# Patient Record
Sex: MALE | Race: WHITE | NOT HISPANIC OR LATINO | Employment: FULL TIME | URBAN - METROPOLITAN AREA
[De-identification: names, ages, dates, MRNs, and addresses within clinical notes are randomized per-mention and may not be internally consistent; named-entity substitution may affect disease eponyms.]

---

## 2018-06-22 ENCOUNTER — LAB REQUISITION (OUTPATIENT)
Dept: LAB | Facility: HOSPITAL | Age: 23
End: 2018-06-22

## 2018-06-22 DIAGNOSIS — Z00.00 ROUTINE GENERAL MEDICAL EXAMINATION AT A HEALTH CARE FACILITY: ICD-10-CM

## 2018-06-22 LAB
ALBUMIN SERPL-MCNC: 4.36 G/DL (ref 3.2–4.8)
ALBUMIN SERPL-MCNC: 4.36 G/DL (ref 3.2–4.8)
ALBUMIN/GLOB SERPL: 1.7 G/DL (ref 1.5–2.5)
ALP SERPL-CCNC: 83 U/L (ref 25–100)
ALP SERPL-CCNC: 83 U/L (ref 25–100)
ALT SERPL W P-5'-P-CCNC: 30 U/L (ref 7–40)
ALT SERPL W P-5'-P-CCNC: 30 U/L (ref 7–40)
ANION GAP SERPL CALCULATED.3IONS-SCNC: 6 MMOL/L (ref 3–11)
AST SERPL-CCNC: 23 U/L (ref 0–33)
AST SERPL-CCNC: 23 U/L (ref 0–33)
BASOPHILS # BLD AUTO: 0.02 10*3/MM3 (ref 0–0.2)
BASOPHILS NFR BLD AUTO: 0.3 % (ref 0–1)
BILIRUB CONJ SERPL-MCNC: 0.2 MG/DL (ref 0–0.2)
BILIRUB INDIRECT SERPL-MCNC: 0.8 MG/DL (ref 0.1–1.1)
BILIRUB SERPL-MCNC: 1 MG/DL (ref 0.3–1.2)
BILIRUB SERPL-MCNC: 1 MG/DL (ref 0.3–1.2)
BUN BLD-MCNC: 12 MG/DL (ref 9–23)
BUN/CREAT SERPL: 9.9 (ref 7–25)
CALCIUM SPEC-SCNC: 9.1 MG/DL (ref 8.7–10.4)
CHLORIDE SERPL-SCNC: 109 MMOL/L (ref 99–109)
CO2 SERPL-SCNC: 29 MMOL/L (ref 20–31)
CREAT BLD-MCNC: 1.21 MG/DL (ref 0.6–1.3)
DEPRECATED RDW RBC AUTO: 42.2 FL (ref 37–54)
EOSINOPHIL # BLD AUTO: 0.11 10*3/MM3 (ref 0–0.3)
EOSINOPHIL NFR BLD AUTO: 1.7 % (ref 0–3)
ERYTHROCYTE [DISTWIDTH] IN BLOOD BY AUTOMATED COUNT: 13 % (ref 11.3–14.5)
GFR SERPL CREATININE-BSD FRML MDRD: 74 ML/MIN/1.73
GFR SERPL CREATININE-BSD FRML MDRD: 89 ML/MIN/1.73
GLOBULIN UR ELPH-MCNC: 2.5 GM/DL
GLUCOSE BLD-MCNC: 90 MG/DL (ref 70–100)
HCT VFR BLD AUTO: 45.9 % (ref 38.9–50.9)
HGB BLD-MCNC: 15.5 G/DL (ref 13.1–17.5)
IMM GRANULOCYTES # BLD: 0.04 10*3/MM3 (ref 0–0.03)
IMM GRANULOCYTES NFR BLD: 0.6 % (ref 0–0.6)
LYMPHOCYTES # BLD AUTO: 2.54 10*3/MM3 (ref 0.6–4.8)
LYMPHOCYTES NFR BLD AUTO: 40.3 % (ref 24–44)
MCH RBC QN AUTO: 30 PG (ref 27–31)
MCHC RBC AUTO-ENTMCNC: 33.8 G/DL (ref 32–36)
MCV RBC AUTO: 89 FL (ref 80–99)
MONOCYTES # BLD AUTO: 0.64 10*3/MM3 (ref 0–1)
MONOCYTES NFR BLD AUTO: 10.1 % (ref 0–12)
NEUTROPHILS # BLD AUTO: 2.96 10*3/MM3 (ref 1.5–8.3)
NEUTROPHILS NFR BLD AUTO: 47 % (ref 41–71)
PLATELET # BLD AUTO: 238 10*3/MM3 (ref 150–450)
PMV BLD AUTO: 10.3 FL (ref 6–12)
POTASSIUM BLD-SCNC: 4.1 MMOL/L (ref 3.5–5.5)
PROT SERPL-MCNC: 6.9 G/DL (ref 5.7–8.2)
PROT SERPL-MCNC: 6.9 G/DL (ref 5.7–8.2)
RBC # BLD AUTO: 5.16 10*6/MM3 (ref 4.2–5.76)
SODIUM BLD-SCNC: 144 MMOL/L (ref 132–146)
WBC NRBC COR # BLD: 6.31 10*3/MM3 (ref 3.5–10.8)

## 2018-06-22 PROCEDURE — 80076 HEPATIC FUNCTION PANEL: CPT

## 2018-06-22 PROCEDURE — 85025 COMPLETE CBC W/AUTO DIFF WBC: CPT

## 2018-06-22 PROCEDURE — 80053 COMPREHEN METABOLIC PANEL: CPT

## 2020-02-03 ENCOUNTER — OFFICE VISIT (OUTPATIENT)
Dept: INTERNAL MEDICINE | Facility: CLINIC | Age: 25
End: 2020-02-03

## 2020-02-03 ENCOUNTER — RESULTS ENCOUNTER (OUTPATIENT)
Dept: INTERNAL MEDICINE | Facility: CLINIC | Age: 25
End: 2020-02-03

## 2020-02-03 VITALS
WEIGHT: 177 LBS | SYSTOLIC BLOOD PRESSURE: 118 MMHG | TEMPERATURE: 98.4 F | BODY MASS INDEX: 25.34 KG/M2 | DIASTOLIC BLOOD PRESSURE: 72 MMHG | HEIGHT: 70 IN | HEART RATE: 89 BPM | OXYGEN SATURATION: 99 %

## 2020-02-03 DIAGNOSIS — F33.1 MODERATE EPISODE OF RECURRENT MAJOR DEPRESSIVE DISORDER (HCC): ICD-10-CM

## 2020-02-03 DIAGNOSIS — Z23 NEED FOR TDAP VACCINATION: ICD-10-CM

## 2020-02-03 DIAGNOSIS — Z13.29 SCREENING FOR ENDOCRINE, METABOLIC AND IMMUNITY DISORDER: ICD-10-CM

## 2020-02-03 DIAGNOSIS — Z13.0 SCREENING FOR ENDOCRINE, METABOLIC AND IMMUNITY DISORDER: ICD-10-CM

## 2020-02-03 DIAGNOSIS — Z28.21 PNEUMOCOCCAL VACCINATION DECLINED: ICD-10-CM

## 2020-02-03 DIAGNOSIS — Z13.228 SCREENING FOR ENDOCRINE, METABOLIC AND IMMUNITY DISORDER: ICD-10-CM

## 2020-02-03 DIAGNOSIS — Z76.89 ENCOUNTER TO ESTABLISH CARE: Primary | ICD-10-CM

## 2020-02-03 DIAGNOSIS — Z13.0 SCREENING FOR DISORDER OF BLOOD AND BLOOD-FORMING ORGANS: ICD-10-CM

## 2020-02-03 DIAGNOSIS — Z28.21 INFLUENZA VACCINATION DECLINED: ICD-10-CM

## 2020-02-03 DIAGNOSIS — F41.9 ANXIETY: ICD-10-CM

## 2020-02-03 DIAGNOSIS — Z00.00 ANNUAL PHYSICAL EXAM: ICD-10-CM

## 2020-02-03 PROCEDURE — 99385 PREV VISIT NEW AGE 18-39: CPT | Performed by: NURSE PRACTITIONER

## 2020-02-03 RX ORDER — HYDROXYZINE PAMOATE 25 MG/1
25 CAPSULE ORAL 3 TIMES DAILY PRN
Qty: 90 CAPSULE | Refills: 0 | Status: SHIPPED | OUTPATIENT
Start: 2020-02-03 | End: 2020-03-16 | Stop reason: SDUPTHER

## 2020-02-03 RX ORDER — AMOXICILLIN 875 MG/1
875 TABLET, COATED ORAL 2 TIMES DAILY
COMMUNITY
End: 2020-02-17

## 2020-02-03 RX ORDER — DICYCLOMINE HYDROCHLORIDE 10 MG/1
CAPSULE ORAL
COMMUNITY
Start: 2020-01-20 | End: 2020-02-03

## 2020-02-03 NOTE — PROGRESS NOTES
Chief Complaint   Patient presents with   • Establish Care     wanting gene sight testing done       Alan Bennett is a 24 y.o. male and is here for a comprehensive physical exam. The patient reports he has history of anxiety and depression.  He has been treated before, was prescribed trazodone & stopped taking it because he didn't like the way it made him feel. His mind continued to race while taking it, increased anxiety.  Anxiety scale = 19, PHQ9 =12 today. He has a significant family history of anxiety and depression.  Sister told him about genetic testing to determine best medication for symptoms, and he would like to be tested.  He reports racing thoughts, social anxiety, excessive worry that keeps him from being able to fall asleep, isn't enjoying activities he normally enjoys, decreased concentration.  He denies SI, HI at this time, states he has considered suicide in the past but never developed a plan or acted on feelings.    He has sinusitis, taking amoxicillin x 2 days.  Had leftover prescription from wisdom teeth, with 5 days left.  Symptoms have been present for 3 days, and include congestion, cough, frequent clearing of the throat, headaches, itchy eyes, itchy nose, wheezing, sinus pressure, sneezing, SOA and diarrhea.   No h/o allergic rhinitis, seasonal allergies.       History:  Erectile dysfunction  no  Nocturia  no    Do you take any herbs or supplements that were not prescribed by a doctor? no    Are you taking aspirin daily? no    Health Habits:  Dental Exam. up to date  Eye Exam. up to date  Exercise: 0 times/week.  Current exercise activities include: none    Health Maintenance   Topic Date Due   • PNEUMOCOCCAL VACCINE (19-64 MEDIUM RISK) (1 of 1 - PPSV23) 02/03/2020 (Originally 7/30/2012)   • TDAP/TD VACCINES (1 - Tdap) 02/03/2020 (Originally 7/30/2004)   • ANNUAL PHYSICAL  02/04/2021   • INFLUENZA VACCINE  Addressed       PMH, PSH, SocHx, FamHx, Allergies, and Medications: Reviewed and  "updated in the Visit Navigator.     No Known Allergies  Past Medical History:   Diagnosis Date   • Anxiety    • Asthma    • Depression      History reviewed. No pertinent surgical history.  Social History     Socioeconomic History   • Marital status: Single     Spouse name: Not on file   • Number of children: Not on file   • Years of education: Not on file   • Highest education level: Not on file   Tobacco Use   • Smoking status: Current Every Day Smoker     Packs/day: 1.00   • Smokeless tobacco: Never Used   Substance and Sexual Activity   • Alcohol use: Never     Frequency: Never   • Drug use: Never     Family History   Problem Relation Age of Onset   • Thyroid disease Mother    • Alcohol abuse Maternal Uncle        Review of Systems  Review of Systems   Constitutional: Positive for fatigue.   HENT: Positive for congestion. Negative for ear pain, tinnitus, trouble swallowing and voice change.    Eyes: Negative for photophobia and visual disturbance.   Respiratory: Negative for chest tightness.    Cardiovascular: Negative for chest pain, palpitations and leg swelling.   Gastrointestinal: Negative for abdominal pain, blood in stool, constipation, nausea and vomiting.   Endocrine: Negative.    Genitourinary: Negative.    Musculoskeletal: Negative.    Skin: Negative for pallor and rash.   Neurological: Negative for seizures, syncope, facial asymmetry, speech difficulty, weakness and light-headedness.   Hematological: Does not bruise/bleed easily.   Psychiatric/Behavioral: Negative for agitation and hallucinations.       Objective   /72   Pulse 89   Temp 98.4 °F (36.9 °C) (Temporal)   Ht 177.8 cm (70\")   Wt 80.3 kg (177 lb)   SpO2 99%   BMI 25.40 kg/m²     Physical Exam   Constitutional: He is oriented to person, place, and time. He appears well-developed and well-nourished. No distress.   HENT:   Head: Normocephalic.   Right Ear: Tympanic membrane, external ear and ear canal normal.   Left Ear: Tympanic " membrane, external ear and ear canal normal.   Nose: No sinus tenderness.   Mouth/Throat: Uvula is midline and mucous membranes are normal. Posterior oropharyngeal erythema present.   Eyes: Pupils are equal, round, and reactive to light. Conjunctivae and EOM are normal.   Neck: Normal range of motion. Neck supple. No thyromegaly present.   Cardiovascular: Normal rate, regular rhythm, normal heart sounds and intact distal pulses.   Pulmonary/Chest: Effort normal and breath sounds normal.   Abdominal: Soft. Normal aorta. There is no hepatosplenomegaly. There is no tenderness. There is no CVA tenderness.   Musculoskeletal: Normal range of motion.   Lymphadenopathy:     He has no cervical adenopathy.   Neurological: He is alert and oriented to person, place, and time. He has normal strength. No sensory deficit. Coordination and gait normal.   CN II-XII normal   Skin: Skin is warm. Capillary refill takes less than 2 seconds.   Psychiatric: His speech is normal and behavior is normal. Thought content normal.   Affect rather flat       Assessment/Plan   1. Healthy male exam.    2. Patient Counseling: Including but not Limited to the following, when appropriate:  --Nutrition: Stressed importance of moderation in sodium/caffeine intake, saturated fat and cholesterol, caloric balance, sufficient intake of fresh fruits, vegetables, fiber  --Exercise: Stressed the importance of regular exercise.   --Substance Abuse: Discussed cessation/primary prevention of tobacco, alcohol, or other drug use; driving or other dangerous activities under the influence; availability of treatment for abuse, as indicated based on social history.    --Sexuality: Discussed sexually transmitted diseases, partner selection, use of condoms and contraceptive alternatives.   --Injury prevention: Discussed safety belts, safety helmets, smoke detector, smoking near bedding or upholstery.   --Dental health: Discussed importance of regular tooth brushing,  flossing, and dental visits.  --Immunizations reviewed.  3. Discussed the patient's BMI with him.  The BMI is in the acceptable range  4. Return if symptoms worsen or fail to improve.  5. Age-appropriate Screening Scheduled    Assessment/Plan     Alan was seen today for establish care.    Diagnoses and all orders for this visit:    Encounter to establish care    Annual physical exam    Influenza vaccination declined    Pneumococcal vaccination declined    Need for Tdap vaccination        - Declined   Anxiety  -     Genetic Testing - Swab,; Future  -     Ambulatory Referral to Behavioral Health  -     T4, Free  -     TSH  -     hydrOXYzine pamoate (VISTARIL) 25 MG capsule; Take 1 capsule by mouth 3 (Three) Times a Day As Needed for Anxiety.  Advised may cause drowsiness, take first dose HS an hour or more before normal bedtime to determine effect.  - Encouraged to by physically active most days, with a goal of 30-45 minutes of moderate exercise most days of the week  - Talk to supportive family and friends about feelings, when needed  Moderate episode of recurrent major depressive disorder (CMS/HCC)  -     Genetic Testing - Swab,; Future  -     Ambulatory Referral to Behavioral Health  Screening for disorder of blood and blood-forming organs  -     CBC & Differential  Screening for endocrine, metabolic and immunity disorder  -     Comprehensive Metabolic Panel  -     T4, Free  -     TSH  Return to clinic for further evaluation, discussion regarding anxiety and depression when Genesight testing available.

## 2020-02-03 NOTE — PATIENT INSTRUCTIONS
[facility] [employer] allows you to send messages to your doctor, view your test results, renew your prescriptions, schedule appointments, and more. To sign up, go to [facility] [employer] and click on the Sign Up Now link in the New User? box. Enter your [employer] Activation Code exactly as it appears below along with the last four digits of your Social Security Number and your Date of Birth () to complete the sign-up process. If you do not sign up before the expiration date, you must request a new code.    [employer] Activation Code: [email or web address]  Expires: 3/4/2020  2:49 PM    If you have questions, you can email [email or web address] or call [phone] to talk to our [employer] staff. Remember, [employer] is NOT to be used for urgent needs. For medical emergencies, dial 911.

## 2020-02-04 LAB
ALBUMIN SERPL-MCNC: 4.1 G/DL (ref 3.5–5.2)
ALBUMIN/GLOB SERPL: 1.9 G/DL
ALP SERPL-CCNC: 65 U/L (ref 39–117)
ALT SERPL-CCNC: 21 U/L (ref 1–41)
AST SERPL-CCNC: 16 U/L (ref 1–40)
BASOPHILS # BLD AUTO: 0.04 10*3/MM3 (ref 0–0.2)
BASOPHILS NFR BLD AUTO: 0.5 % (ref 0–1.5)
BILIRUB SERPL-MCNC: 0.2 MG/DL (ref 0.2–1.2)
BUN SERPL-MCNC: 13 MG/DL (ref 6–20)
BUN/CREAT SERPL: 13.1 (ref 7–25)
CALCIUM SERPL-MCNC: 8.8 MG/DL (ref 8.6–10.5)
CHLORIDE SERPL-SCNC: 106 MMOL/L (ref 98–107)
CO2 SERPL-SCNC: 26 MMOL/L (ref 22–29)
CREAT SERPL-MCNC: 0.99 MG/DL (ref 0.76–1.27)
EOSINOPHIL # BLD AUTO: 0.22 10*3/MM3 (ref 0–0.4)
EOSINOPHIL NFR BLD AUTO: 2.7 % (ref 0.3–6.2)
ERYTHROCYTE [DISTWIDTH] IN BLOOD BY AUTOMATED COUNT: 13.1 % (ref 12.3–15.4)
GLOBULIN SER CALC-MCNC: 2.2 GM/DL
GLUCOSE SERPL-MCNC: 70 MG/DL (ref 65–99)
HCT VFR BLD AUTO: 42.9 % (ref 37.5–51)
HGB BLD-MCNC: 14.8 G/DL (ref 13–17.7)
IMM GRANULOCYTES # BLD AUTO: 0.06 10*3/MM3 (ref 0–0.05)
IMM GRANULOCYTES NFR BLD AUTO: 0.7 % (ref 0–0.5)
LYMPHOCYTES # BLD AUTO: 1.85 10*3/MM3 (ref 0.7–3.1)
LYMPHOCYTES NFR BLD AUTO: 22.6 % (ref 19.6–45.3)
MCH RBC QN AUTO: 31.6 PG (ref 26.6–33)
MCHC RBC AUTO-ENTMCNC: 34.5 G/DL (ref 31.5–35.7)
MCV RBC AUTO: 91.5 FL (ref 79–97)
MONOCYTES # BLD AUTO: 0.75 10*3/MM3 (ref 0.1–0.9)
MONOCYTES NFR BLD AUTO: 9.2 % (ref 5–12)
NEUTROPHILS # BLD AUTO: 5.27 10*3/MM3 (ref 1.7–7)
NEUTROPHILS NFR BLD AUTO: 64.3 % (ref 42.7–76)
NRBC BLD AUTO-RTO: 0 /100 WBC (ref 0–0.2)
PLATELET # BLD AUTO: 275 10*3/MM3 (ref 140–450)
POTASSIUM SERPL-SCNC: 4.1 MMOL/L (ref 3.5–5.2)
PROT SERPL-MCNC: 6.3 G/DL (ref 6–8.5)
RBC # BLD AUTO: 4.69 10*6/MM3 (ref 4.14–5.8)
SODIUM SERPL-SCNC: 141 MMOL/L (ref 136–145)
T4 FREE SERPL-MCNC: 1.02 NG/DL (ref 0.93–1.7)
TSH SERPL DL<=0.005 MIU/L-ACNC: 4.04 UIU/ML (ref 0.27–4.2)
WBC # BLD AUTO: 8.19 10*3/MM3 (ref 3.4–10.8)

## 2020-02-13 ENCOUNTER — TELEPHONE (OUTPATIENT)
Dept: INTERNAL MEDICINE | Facility: CLINIC | Age: 25
End: 2020-02-13

## 2020-02-13 NOTE — TELEPHONE ENCOUNTER
Patients mom (on [employer]) called and requested information on a naltrexone shot to help with alcohol abuse. She would like for the patient to have this done if possible. She would also like to know if the shot is covered by insurance and if not how much it would be out of pocket.     Please call patients mother back at [phone] with information.

## 2020-02-13 NOTE — TELEPHONE ENCOUNTER
Talked with mom and made appt for 3pm on Monday for consultation. Told her I didn't know whether Hannah prescribed or not that she would have to talk to Hannah about this

## 2020-02-17 ENCOUNTER — OFFICE VISIT (OUTPATIENT)
Dept: INTERNAL MEDICINE | Facility: CLINIC | Age: 25
End: 2020-02-17

## 2020-02-17 ENCOUNTER — TELEPHONE (OUTPATIENT)
Dept: INTERNAL MEDICINE | Facility: CLINIC | Age: 25
End: 2020-02-17

## 2020-02-17 VITALS
BODY MASS INDEX: 25.34 KG/M2 | TEMPERATURE: 98.1 F | WEIGHT: 177 LBS | SYSTOLIC BLOOD PRESSURE: 131 MMHG | DIASTOLIC BLOOD PRESSURE: 57 MMHG | HEIGHT: 70 IN | OXYGEN SATURATION: 100 % | HEART RATE: 87 BPM

## 2020-02-17 DIAGNOSIS — F33.1 MODERATE EPISODE OF RECURRENT MAJOR DEPRESSIVE DISORDER (HCC): ICD-10-CM

## 2020-02-17 DIAGNOSIS — Z51.81 MEDICATION MONITORING ENCOUNTER: ICD-10-CM

## 2020-02-17 DIAGNOSIS — Z13.0 SCREENING FOR ENDOCRINE, METABOLIC AND IMMUNITY DISORDER: ICD-10-CM

## 2020-02-17 DIAGNOSIS — Z13.228 SCREENING FOR ENDOCRINE, METABOLIC AND IMMUNITY DISORDER: ICD-10-CM

## 2020-02-17 DIAGNOSIS — IMO0001 ALCOHOLISM /ALCOHOL ABUSE: Primary | ICD-10-CM

## 2020-02-17 DIAGNOSIS — F41.9 ANXIETY: ICD-10-CM

## 2020-02-17 DIAGNOSIS — Z13.29 SCREENING FOR ENDOCRINE, METABOLIC AND IMMUNITY DISORDER: ICD-10-CM

## 2020-02-17 DIAGNOSIS — Z13.0 SCREENING FOR DISORDER OF BLOOD AND BLOOD-FORMING ORGANS: ICD-10-CM

## 2020-02-17 PROCEDURE — 99214 OFFICE O/P EST MOD 30 MIN: CPT | Performed by: NURSE PRACTITIONER

## 2020-02-17 RX ORDER — DISULFIRAM 250 MG/1
TABLET ORAL
Qty: 21 TABLET | Refills: 0 | Status: SHIPPED | OUTPATIENT
Start: 2020-02-17 | End: 2020-02-19

## 2020-02-17 NOTE — TELEPHONE ENCOUNTER
Patient called regarding RX that was prescribed to him today. RX, disulfiram (ANTABUSE) 250 MG tablet, was prescribed and patient Wants to know if there is something a little less mild that he can take. Patient would like a call back someone tomorrow.    Please advise.  Call back:[phone]

## 2020-02-17 NOTE — PROGRESS NOTES
Date: 2020    Name: Alan Bennett  : 1995    Chief Complaint:   Chief Complaint   Patient presents with   • Follow-up     wanting help with alcohol addiction       HPI:  Alan Bennett is a 24 y.o. male presents requesting help with alcohol addiction.  He drinks 1/2 liter at least 1-2 x a week, and has for about 6-7 months.  He drinks that much when he is upset, unable to identify a particular reason he started drinking more at that time.  He has noted this is interfering with personal relationships, he is isolating himself in order to drink in private/without people judging his drinking.  His last drink was 3 days ago; he noted hand tremors, facial flushing, diaphoresis, insomnia and occasional dizziness & nausea since then.    He has been taking hydroxyzine once daily since last visit 2 weeks ago. He has noted a slight decrease in anxiety while taking the medication.  Reports it does not make him drowsy.  Denies current SI, HI, decreased concentration, change in appetite.  He is not participating in activities with friends he normally enjoys, not sure if related to anxiety/depression or drinking.       History:  The following portions of the patient's history were reviewed and updated as appropriate: allergies, current medications, past medical history, family history, surgical history, social history and problem list.     ROS:  Review of Systems   Constitutional: Positive for fatigue. Negative for chills.   HENT: Negative.    Respiratory: Negative.    Cardiovascular: Negative.    Gastrointestinal: Negative.    Genitourinary: Negative.    Skin: Negative.    Neurological: Negative for seizures, speech difficulty, weakness and headache.   Hematological: Does not bruise/bleed easily.   Psychiatric/Behavioral: Positive for agitation. Negative for hallucinations, negative for hyperactivity and stress.       VS:  Vitals:    20 1517   BP: 131/57   Pulse: 87   Temp: 98.1 °F (36.7 °C)   TempSrc: Temporal  "  SpO2: 100%   Weight: 80.3 kg (177 lb)   Height: 177.8 cm (70\")     Body mass index is 25.4 kg/m².    PE:  Physical Exam   Constitutional: He is oriented to person, place, and time. He appears well-developed and well-nourished. No distress.   HENT:   Head: Normocephalic.   Mouth/Throat: Oropharynx is clear and moist.   Eyes: Pupils are equal, round, and reactive to light. Conjunctivae and EOM are normal.   Neck: Normal range of motion. Neck supple.   Cardiovascular: Normal rate, regular rhythm, normal heart sounds and intact distal pulses.   Pulmonary/Chest: Effort normal and breath sounds normal.   Neurological: He is alert and oriented to person, place, and time. He has normal strength. No sensory deficit. He displays a negative Romberg sign.   CN II-XII normal   Skin: Skin is warm. Capillary refill takes less than 2 seconds.   Psychiatric: His speech is normal and behavior is normal. Thought content normal. His affect is blunt.       Assessment/Plan:  Alan was seen today for follow-up.    Diagnoses and all orders for this visit:    Alcoholism /alcohol abuse (CMS/HCC)  -     disulfiram (ANTABUSE) 250 MG tablet; Take 0.5 tablets by mouth Daily for 14 days, THEN 1 tablet Daily for 14 days. Advised of disulfiram-ETOH reaction, patient verbalizes understanding.  LFT's normal in the past 2 weeks.    -     Ambulatory Referral to Social Work  - Referral to behavioral health placed on 2/3/20, no answer when called to make an appointment.  Letter mailed with instructions regarding making an appointment.  Screening for disorder of blood and blood-forming organs  -     CBC (No Diff)  Screening for endocrine, metabolic and immunity disorder  -     Comprehensive Metabolic Panel  Anxiety  Moderate episode of recurrent major depressive disorder (CMS/HCC)         - Continue hydroxyzine as prescribed, may take up to 3 x a day prn         - Will discuss other medications for depression, if desired, at next appointment in 1 " month  Medication monitoring encounter  -     CBC (No Diff)  -     Comprehensive Metabolic Panel    Return in about 4 weeks (around 3/16/2020) for Next scheduled follow up.

## 2020-02-18 NOTE — TELEPHONE ENCOUNTER
I have tried to call Alan 3 times today, his voice mail has not been set up and he did not answer calls.  Will try again tomorrow.

## 2020-02-19 DIAGNOSIS — F33.1 MODERATE EPISODE OF RECURRENT MAJOR DEPRESSIVE DISORDER (HCC): Primary | ICD-10-CM

## 2020-02-19 RX ORDER — BUPROPION HYDROCHLORIDE 150 MG/1
150 TABLET ORAL DAILY
Qty: 30 TABLET | Refills: 1 | Status: SHIPPED | OUTPATIENT
Start: 2020-02-19 | End: 2020-03-16 | Stop reason: SDUPTHER

## 2020-02-21 ENCOUNTER — PATIENT OUTREACH (OUTPATIENT)
Dept: CASE MANAGEMENT | Facility: OTHER | Age: 25
End: 2020-02-21

## 2020-02-21 NOTE — OUTREACH NOTE
Patient Outreach Note    SW attempted to reach pt. No answer. Unable to leave .  not set up yet    MAJO Ho  Ambulatory     2/21/2020, 9:38 AM

## 2020-03-02 ENCOUNTER — PATIENT OUTREACH (OUTPATIENT)
Dept: CASE MANAGEMENT | Facility: OTHER | Age: 25
End: 2020-03-02

## 2020-03-02 NOTE — OUTREACH NOTE
"Patient Outreach Note    SW attempted to reach pt. No answer and VM is not set up yet    JULI contacted pt's emergency contact Biju,( pt's brother) to see if pt had an updated phone number,  Biju stated the number I had was the correct phone number. Biju stated he lives with the pt and can give him a message. JULI asked if Biju can have pt call me back. Biju stated \"Yes, he will give Alan the message. Alan currently does not have a phone due to taking that and his car keys away because Alan would go get more alcohol\".  Biju stated pt\" took a leave of absence from work to try and get himself together\".  Biju stated when he gets home from work he will talk to Alan and let him use his phone to call me back.       MAJO Ho  Ambulatory     3/2/2020, 9:38 AM      "

## 2020-03-09 ENCOUNTER — PATIENT OUTREACH (OUTPATIENT)
Dept: CASE MANAGEMENT | Facility: OTHER | Age: 25
End: 2020-03-09

## 2020-03-09 NOTE — OUTREACH NOTE
"Patient Outreach Note    JULI reached out to Alan, pt's emergency contact to see if he had an updated phone number. Alan stated \"that is his phone number but he is probably asleep\". Alan stated \"pt usually sleeps until 3pm\". Alan stated pt has been off work for a while but plans to go back this week. Alan stated pt \"can benefit from counseling\" and asked if JULI would make pt an appt and call pt back with info.  JULI told Alan that yes, we can make pt an appt and call him to let him know.     MAJO Ho  Ambulatory     3/9/2020, 1:13 PM      "

## 2020-03-09 NOTE — OUTREACH NOTE
Care Coordination Note    SW left  for Melecio at [facility] for a call back    MAJO Ho  Ambulatory     3/9/2020, 1:16 PM

## 2020-03-09 NOTE — OUTREACH NOTE
Patient Outreach Note    SW attempted to reach pt to let him know about the appt. No answer and unable to leave VM.    SW called Biju , to let him know to have Alan call me.     MAJO Ho  Ambulatory     3/9/2020, 4:09 PM

## 2020-03-09 NOTE — OUTREACH NOTE
"Patient Outreach Note    SW attempted to contact pt/. No answer. \"VM is not set up yet\".    MAJO Ho  Ambulatory     3/9/2020, 1:13 PM      "

## 2020-03-16 DIAGNOSIS — F33.1 MODERATE EPISODE OF RECURRENT MAJOR DEPRESSIVE DISORDER (HCC): ICD-10-CM

## 2020-03-16 DIAGNOSIS — F41.9 ANXIETY: ICD-10-CM

## 2020-03-16 RX ORDER — HYDROXYZINE PAMOATE 25 MG/1
25 CAPSULE ORAL 3 TIMES DAILY PRN
Qty: 90 CAPSULE | Refills: 0 | Status: SHIPPED | OUTPATIENT
Start: 2020-03-16 | End: 2020-05-20

## 2020-03-16 RX ORDER — BUPROPION HYDROCHLORIDE 150 MG/1
150 TABLET ORAL DAILY
Qty: 30 TABLET | Refills: 1 | Status: SHIPPED | OUTPATIENT
Start: 2020-03-16 | End: 2020-05-20 | Stop reason: DRUGHIGH

## 2020-03-17 ENCOUNTER — PATIENT OUTREACH (OUTPATIENT)
Dept: CASE MANAGEMENT | Facility: OTHER | Age: 25
End: 2020-03-17

## 2020-03-17 NOTE — OUTREACH NOTE
"Patient Outreach Note    SW spoke with pt's brother, Biju. Biju stated pt was aware of his counseling appt today and \"hopefully he went\". 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     JULI will call pt tomorrow to follow up on his counseling appt.     MAJO Ho  Ambulatory     3/17/2020, 15:20      "

## 2020-03-17 NOTE — OUTREACH NOTE
Patient Outreach Note    SW attempted to reach pt. No answer. MAJO Farnsworth  Ambulatory     3/17/2020, 15:13

## 2020-05-11 ENCOUNTER — TELEPHONE (OUTPATIENT)
Dept: INTERNAL MEDICINE | Facility: CLINIC | Age: 25
End: 2020-05-11

## 2020-05-11 DIAGNOSIS — IMO0001 ALCOHOLISM /ALCOHOL ABUSE: Primary | ICD-10-CM

## 2020-05-11 RX ORDER — DISULFIRAM 250 MG/1
TABLET ORAL
Qty: 21 TABLET | Refills: 0 | Status: SHIPPED | OUTPATIENT
Start: 2020-05-11 | End: 2020-05-13 | Stop reason: SDUPTHER

## 2020-05-11 NOTE — TELEPHONE ENCOUNTER
PATIENT CALLED TO REQUEST A PRESCRIPTION FOR disulfiram (ANTABUSE) 250 MG tablet     THAT KYLIE MADELYN WANTED TO PREVIOUSLY PRESCRIBED BUT STATED HE DID NOT WANT TO TAKE IT AT THAT TIME BUT IS NOW WANTING A PRESCRIPTION FOR IT.    PHARMACY VERIFIED [facility]  [id number] - [place], [place] - [facility] [address] [facility] [address] - [phone] PH - [phone] FX       PLEASE CALL AND CONFIRM [phone]

## 2020-05-11 NOTE — TELEPHONE ENCOUNTER
Refill sent to [facility].  Please call and advise patient & make a follow up appointment (missed on 5/4/20)

## 2020-05-12 ENCOUNTER — TELEPHONE (OUTPATIENT)
Dept: INTERNAL MEDICINE | Facility: CLINIC | Age: 25
End: 2020-05-12

## 2020-05-12 NOTE — TELEPHONE ENCOUNTER
I called and verified [facility] has this product available from their warehouse.  Can one of you call and transfer his prescription to [facility] and let Alan know?  Thanks!

## 2020-05-12 NOTE — TELEPHONE ENCOUNTER
PT CALLED AND STATED PHARMACY STATED disulfiram (Antabuse) 250 MG tablet WAS ON BACK ORDER    PT IS REQUESTING AN ALTERNATIVE MEDICATION    [id number] - [place], [place] - [address] AT [facility] [address] - [phone]  - [phone] FX

## 2020-05-13 DIAGNOSIS — IMO0001 ALCOHOLISM /ALCOHOL ABUSE: ICD-10-CM

## 2020-05-13 RX ORDER — DISULFIRAM 250 MG/1
TABLET ORAL
Qty: 21 TABLET | Refills: 0 | Status: SHIPPED | OUTPATIENT
Start: 2020-05-13 | End: 2020-07-21

## 2020-05-20 ENCOUNTER — OFFICE VISIT (OUTPATIENT)
Dept: INTERNAL MEDICINE | Facility: CLINIC | Age: 25
End: 2020-05-20

## 2020-05-20 VITALS
BODY MASS INDEX: 25.48 KG/M2 | OXYGEN SATURATION: 99 % | SYSTOLIC BLOOD PRESSURE: 118 MMHG | HEART RATE: 90 BPM | DIASTOLIC BLOOD PRESSURE: 78 MMHG | HEIGHT: 70 IN | WEIGHT: 178 LBS | TEMPERATURE: 98.4 F

## 2020-05-20 DIAGNOSIS — IMO0001 ALCOHOLISM /ALCOHOL ABUSE: ICD-10-CM

## 2020-05-20 DIAGNOSIS — F33.1 MODERATE EPISODE OF RECURRENT MAJOR DEPRESSIVE DISORDER (HCC): Primary | ICD-10-CM

## 2020-05-20 DIAGNOSIS — F41.9 ANXIETY: ICD-10-CM

## 2020-05-20 PROCEDURE — 99214 OFFICE O/P EST MOD 30 MIN: CPT | Performed by: NURSE PRACTITIONER

## 2020-05-20 RX ORDER — BUPROPION HYDROCHLORIDE 300 MG/1
300 TABLET ORAL DAILY
Qty: 30 TABLET | Refills: 5 | Status: SHIPPED | OUTPATIENT
Start: 2020-05-20

## 2020-05-20 RX ORDER — BUSPIRONE HYDROCHLORIDE 5 MG/1
5 TABLET ORAL 3 TIMES DAILY PRN
Qty: 90 TABLET | Refills: 1 | Status: SHIPPED | OUTPATIENT
Start: 2020-05-20
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
## 2020-06-03 ENCOUNTER — TELEPHONE (OUTPATIENT)
Dept: INTERNAL MEDICINE | Facility: CLINIC | Age: 25
End: 2020-06-03

## 2020-07-06 ENCOUNTER — TELEPHONE (OUTPATIENT)
Dept: INTERNAL MEDICINE | Facility: CLINIC | Age: 25
End: 2020-07-06

## 2020-07-06 PROCEDURE — U0002 COVID-19 LAB TEST NON-CDC: HCPCS | Performed by: NURSE PRACTITIONER

## 2020-07-06 PROCEDURE — U0004 COV-19 TEST NON-CDC HGH THRU: HCPCS | Performed by: NURSE PRACTITIONER

## 2020-07-06 NOTE — TELEPHONE ENCOUNTER
PATIENT STATES HE FEELS FLUSHED AND REALLY HOT  TROUBLE WITH BREATHING AND TIGHTNESS IN CHEST   TEMP WAS 99.8 YESTERDAY   HE HAS A COUGH AND LOSS OF TASTE  HE HAS FATIGUE      PLEASE ADVISE   PATIENT WOULD LIKE TO BE TESTED FOR COVID 19     PATIENT CALL BACK [phone]

## 2020-07-20 ENCOUNTER — PATIENT OUTREACH (OUTPATIENT)
Dept: CASE MANAGEMENT | Facility: OTHER | Age: 25
End: 2020-07-20

## 2020-07-21 DIAGNOSIS — IMO0001 ALCOHOLISM /ALCOHOL ABUSE: ICD-10-CM

## 2020-07-21 RX ORDER — DISULFIRAM 250 MG/1
250 TABLET ORAL DAILY
Qty: 30 TABLET | Refills: 1 | Status: SHIPPED | OUTPATIENT
Start: 2020-07-21

## 2020-07-22 ENCOUNTER — PATIENT OUTREACH (OUTPATIENT)
Dept: CASE MANAGEMENT | Facility: OTHER | Age: 25
End: 2020-07-22

## 2020-07-22 NOTE — OUTREACH NOTE
"Patient Outreach Note    JULI received a call from pt's brother in law, Biju. Biju stated, \"Alan is doing quite well actually.There was a time pre-pandemic where he had a bottom out moment, but it was a very sobering experience. He started taking antabuse and has said he has had zero cravings. He started a new job recently and even a new relationship.His entire attitude has changed for the better\". JULI was very happy to hear this news.  JULI told Biju that if pt's circumstances change that he is welcome to contact JULI and provided work cell phone number.     MAJO Ho  Ambulatory     7/21/2020, 4:50pm      "

## 2020-07-22 NOTE — OUTREACH NOTE
Patient Outreach Note    SW attempted to reach out to pt. No answer MAJO Farnsworth  Ambulatory     7/20/2020,

## 2020-07-22 NOTE — OUTREACH NOTE
Patient Outreach Note    SW also attempted to reach out to pt's brother in law Biju. No answer. LVMAJO Barroso  Ambulatory     7/20/2020

## 2023-07-07 NOTE — OUTREACH NOTE
Care Coordination Note    Melecio Nuñez called back with an appt date of 3.17.2020 at 1pm.  SW will let pt know    MAJO Ho  Ambulatory     3/9/2020, 4:08 PM       L-14/Clothing

## 2025-02-04 ENCOUNTER — OFFICE VISIT (OUTPATIENT)
Dept: INTERNAL MEDICINE | Facility: CLINIC | Age: 30
End: 2025-02-04

## 2025-02-04 VITALS
SYSTOLIC BLOOD PRESSURE: 118 MMHG | HEART RATE: 73 BPM | TEMPERATURE: 98.4 F | WEIGHT: 178 LBS | BODY MASS INDEX: 26.98 KG/M2 | OXYGEN SATURATION: 98 % | HEIGHT: 68 IN | DIASTOLIC BLOOD PRESSURE: 70 MMHG

## 2025-02-04 DIAGNOSIS — Z13.1 SCREENING FOR DIABETES MELLITUS: ICD-10-CM

## 2025-02-04 DIAGNOSIS — Z13.0 SCREENING FOR ENDOCRINE, METABOLIC AND IMMUNITY DISORDER: ICD-10-CM

## 2025-02-04 DIAGNOSIS — F10.11 HISTORY OF ALCOHOL ABUSE: ICD-10-CM

## 2025-02-04 DIAGNOSIS — Z13.29 SCREENING FOR ENDOCRINE, METABOLIC AND IMMUNITY DISORDER: ICD-10-CM

## 2025-02-04 DIAGNOSIS — Z00.00 ANNUAL PHYSICAL EXAM: ICD-10-CM

## 2025-02-04 DIAGNOSIS — Z13.0 SCREENING FOR DISORDER OF BLOOD AND BLOOD-FORMING ORGANS: ICD-10-CM

## 2025-02-04 DIAGNOSIS — R19.7 FREQUENT DIARRHEA: ICD-10-CM

## 2025-02-04 DIAGNOSIS — Z13.228 SCREENING FOR ENDOCRINE, METABOLIC AND IMMUNITY DISORDER: ICD-10-CM

## 2025-02-04 DIAGNOSIS — R53.83 OTHER FATIGUE: ICD-10-CM

## 2025-02-04 DIAGNOSIS — Z83.49 FAMILY HISTORY OF THYROID DISEASE: ICD-10-CM

## 2025-02-04 DIAGNOSIS — Z83.79 FAMILY HISTORY OF CELIAC DISEASE: ICD-10-CM

## 2025-02-04 DIAGNOSIS — F51.5 NIGHTMARES: ICD-10-CM

## 2025-02-04 DIAGNOSIS — R06.83 SNORING: ICD-10-CM

## 2025-02-04 DIAGNOSIS — Z76.89 ENCOUNTER TO ESTABLISH CARE: Primary | ICD-10-CM

## 2025-02-04 PROCEDURE — 99385 PREV VISIT NEW AGE 18-39: CPT | Performed by: NURSE PRACTITIONER
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
## 2025-02-07 LAB
ALBUMIN SERPL-MCNC: 4.4 G/DL (ref 3.5–5.2)
ALBUMIN/GLOB SERPL: 1.6 G/DL
ALP SERPL-CCNC: 73 U/L (ref 39–117)
ALT SERPL-CCNC: 25 U/L (ref 1–41)
AST SERPL-CCNC: 18 U/L (ref 1–40)
BASOPHILS # BLD AUTO: 0.03 10*3/MM3 (ref 0–0.2)
BASOPHILS NFR BLD AUTO: 0.4 % (ref 0–1.5)
BILIRUB SERPL-MCNC: 0.5 MG/DL (ref 0–1.2)
BUN SERPL-MCNC: 15 MG/DL (ref 6–20)
BUN/CREAT SERPL: 13.6 (ref 7–25)
CALCIUM SERPL-MCNC: 9.4 MG/DL (ref 8.6–10.5)
CHLORIDE SERPL-SCNC: 105 MMOL/L (ref 98–107)
CO2 SERPL-SCNC: 23.6 MMOL/L (ref 22–29)
CREAT SERPL-MCNC: 1.1 MG/DL (ref 0.76–1.27)
EGFRCR SERPLBLD CKD-EPI 2021: 92 ML/MIN/1.73
ENDOMYSIUM IGA SER QL: NEGATIVE
EOSINOPHIL # BLD AUTO: 0.03 10*3/MM3 (ref 0–0.4)
EOSINOPHIL NFR BLD AUTO: 0.4 % (ref 0.3–6.2)
ERYTHROCYTE [DISTWIDTH] IN BLOOD BY AUTOMATED COUNT: 13 % (ref 12.3–15.4)
GLOBULIN SER CALC-MCNC: 2.7 GM/DL
GLUCOSE SERPL-MCNC: 99 MG/DL (ref 65–99)
HCT VFR BLD AUTO: 45.5 % (ref 37.5–51)
HGB BLD-MCNC: 15.9 G/DL (ref 13–17.7)
IGA SERPL-MCNC: <5 MG/DL (ref 90–386)
IMM GRANULOCYTES # BLD AUTO: 0.04 10*3/MM3 (ref 0–0.05)
IMM GRANULOCYTES NFR BLD AUTO: 0.5 % (ref 0–0.5)
LYMPHOCYTES # BLD AUTO: 1.63 10*3/MM3 (ref 0.7–3.1)
LYMPHOCYTES NFR BLD AUTO: 20.6 % (ref 19.6–45.3)
MCH RBC QN AUTO: 30.3 PG (ref 26.6–33)
MCHC RBC AUTO-ENTMCNC: 34.9 G/DL (ref 31.5–35.7)
MCV RBC AUTO: 86.7 FL (ref 79–97)
MONOCYTES # BLD AUTO: 0.43 10*3/MM3 (ref 0.1–0.9)
MONOCYTES NFR BLD AUTO: 5.4 % (ref 5–12)
NEUTROPHILS # BLD AUTO: 5.77 10*3/MM3 (ref 1.7–7)
NEUTROPHILS NFR BLD AUTO: 72.7 % (ref 42.7–76)
NRBC BLD AUTO-RTO: 0 /100 WBC (ref 0–0.2)
PLATELET # BLD AUTO: 261 10*3/MM3 (ref 140–450)
POTASSIUM SERPL-SCNC: 4.6 MMOL/L (ref 3.5–5.2)
PROT SERPL-MCNC: 7.1 G/DL (ref 6–8.5)
RBC # BLD AUTO: 5.25 10*6/MM3 (ref 4.14–5.8)
SODIUM SERPL-SCNC: 141 MMOL/L (ref 136–145)
T4 FREE SERPL-MCNC: 1.39 NG/DL (ref 0.92–1.68)
THYROGLOB AB SERPL-ACNC: <1 IU/ML (ref 0–0.9)
THYROPEROXIDASE AB SERPL-ACNC: <9 IU/ML (ref 0–34)
TSH SERPL DL<=0.005 MIU/L-ACNC: 2.39 UIU/ML (ref 0.27–4.2)
TTG IGA SER-ACNC: <2 U/ML (ref 0–3)
TTG IGG SER-ACNC: 4 U/ML (ref 0–5)
WBC # BLD AUTO: 7.93 10*3/MM3 (ref 3.4–10.8)

## 2025-02-10 NOTE — PROGRESS NOTES
Low IgA which can be associated with inflammation.  Remainder of tests are negative, may be a false negative.  IgA deficiency is common with celiac disease. Recommend referral to GI, if he agrees.   Thyroid antibodies are negative.   OK to refill Glipizide 5 mg, 2 tablets BID?  Looks like previously ordered by outside provider.